# Patient Record
Sex: MALE | Race: WHITE | NOT HISPANIC OR LATINO | Employment: FULL TIME | URBAN - METROPOLITAN AREA
[De-identification: names, ages, dates, MRNs, and addresses within clinical notes are randomized per-mention and may not be internally consistent; named-entity substitution may affect disease eponyms.]

---

## 2020-07-23 ENCOUNTER — HISTORICAL (OUTPATIENT)
Dept: ADMINISTRATIVE | Facility: HOSPITAL | Age: 50
End: 2020-07-23

## 2020-07-23 LAB
ABS NEUT (OLG): 3.8 X10(3)/MCL (ref 2.1–9.2)
ALBUMIN SERPL-MCNC: 4.5 GM/DL (ref 3.4–5)
ALBUMIN/GLOB SERPL: 1.32 {RATIO} (ref 1.5–2.5)
ALP SERPL-CCNC: 76 UNIT/L (ref 38–126)
ALT SERPL-CCNC: 16 UNIT/L (ref 7–52)
APPEARANCE, UA: CLEAR
AST SERPL-CCNC: 18 UNIT/L (ref 15–37)
BACTERIA #/AREA URNS AUTO: NORMAL /HPF
BILIRUB SERPL-MCNC: 0.6 MG/DL (ref 0.2–1)
BILIRUB UR QL STRIP: NEGATIVE MG/DL
BILIRUBIN DIRECT+TOT PNL SERPL-MCNC: 0.1 MG/DL (ref 0–0.5)
BILIRUBIN DIRECT+TOT PNL SERPL-MCNC: 0.5 MG/DL
BUN SERPL-MCNC: 13 MG/DL (ref 7–18)
CALCIUM SERPL-MCNC: 9.7 MG/DL (ref 8.5–10.1)
CHLORIDE SERPL-SCNC: 103 MMOL/L (ref 98–107)
CHOLEST SERPL-MCNC: 192 MG/DL (ref 0–200)
CHOLEST/HDLC SERPL: 3.8 {RATIO}
CO2 SERPL-SCNC: 27 MMOL/L (ref 21–32)
COLOR UR: YELLOW
CREAT SERPL-MCNC: 1.02 MG/DL (ref 0.6–1.3)
ERYTHROCYTE [DISTWIDTH] IN BLOOD BY AUTOMATED COUNT: 12.7 % (ref 11.5–17)
GLOBULIN SER-MCNC: 3.4 GM/DL (ref 1.2–3)
GLUCOSE (UA): NEGATIVE MG/DL
GLUCOSE SERPL-MCNC: 94 MG/DL (ref 74–106)
HCT VFR BLD AUTO: 40.2 % (ref 42–52)
HDLC SERPL-MCNC: 51 MG/DL (ref 35–60)
HGB BLD-MCNC: 13.5 GM/DL (ref 14–18)
HGB UR QL STRIP: NEGATIVE UNIT/L
KETONES UR QL STRIP: NEGATIVE MG/DL
LDLC SERPL CALC-MCNC: 117 MG/DL (ref 0–129)
LEUKOCYTE ESTERASE UR QL STRIP: NEGATIVE UNIT/L
LYMPHOCYTES # BLD AUTO: 1.7 X10(3)/MCL (ref 0.6–3.4)
LYMPHOCYTES NFR BLD AUTO: 27.9 % (ref 13–40)
MCH RBC QN AUTO: 30.3 PG (ref 27–31.2)
MCHC RBC AUTO-ENTMCNC: 34 GM/DL (ref 32–36)
MCV RBC AUTO: 90 FL (ref 80–94)
MONOCYTES # BLD AUTO: 0.6 X10(3)/MCL (ref 0.1–1.3)
MONOCYTES NFR BLD AUTO: 10.6 % (ref 0.1–24)
NEUTROPHILS NFR BLD AUTO: 61.5 % (ref 47–80)
NITRITE UR QL STRIP.AUTO: NEGATIVE
PH UR STRIP: 7 [PH]
PLATELET # BLD AUTO: 261 X10(3)/MCL (ref 130–400)
PMV BLD AUTO: 10.5 FL (ref 9.4–12.4)
POTASSIUM SERPL-SCNC: 4.3 MMOL/L (ref 3.5–5.1)
PROT SERPL-MCNC: 7.9 GM/DL (ref 6.4–8.2)
PROT UR QL STRIP: NEGATIVE MG/DL
RBC # BLD AUTO: 4.45 X10(6)/MCL (ref 4.7–6.1)
RBC #/AREA URNS HPF: NORMAL /HPF
SODIUM SERPL-SCNC: 139 MMOL/L (ref 136–145)
SP GR UR STRIP: 1.01
SQUAMOUS EPITHELIAL, UA: NORMAL /LPF
TRIGL SERPL-MCNC: 108 MG/DL (ref 30–150)
UROBILINOGEN UR STRIP-ACNC: 0.2 MG/DL
VLDLC SERPL CALC-MCNC: 21.6 MG/DL
WBC # SPEC AUTO: 6.1 X10(3)/MCL (ref 4.5–11.5)
WBC #/AREA URNS AUTO: NORMAL /[HPF]

## 2021-04-12 ENCOUNTER — HISTORICAL (OUTPATIENT)
Dept: ADMINISTRATIVE | Facility: HOSPITAL | Age: 51
End: 2021-04-12

## 2021-04-12 LAB
ABS NEUT (OLG): 3.3 X10(3)/MCL (ref 2.1–9.2)
ALBUMIN SERPL-MCNC: 4.8 GM/DL (ref 3.4–5)
ALBUMIN/GLOB SERPL: 1.85 {RATIO} (ref 1.5–2.5)
ALP SERPL-CCNC: 77 UNIT/L (ref 38–126)
ALT SERPL-CCNC: 18 UNIT/L (ref 7–52)
AST SERPL-CCNC: 17 UNIT/L (ref 15–37)
BILIRUB SERPL-MCNC: 0.7 MG/DL (ref 0.2–1)
BILIRUBIN DIRECT+TOT PNL SERPL-MCNC: 0.1 MG/DL (ref 0–0.5)
BILIRUBIN DIRECT+TOT PNL SERPL-MCNC: 0.6 MG/DL
BUN SERPL-MCNC: 15 MG/DL (ref 7–18)
CALCIUM SERPL-MCNC: 9.9 MG/DL (ref 8.5–10.1)
CHLORIDE SERPL-SCNC: 102 MMOL/L (ref 98–107)
CHOLEST SERPL-MCNC: 223 MG/DL (ref 0–200)
CHOLEST/HDLC SERPL: 3.9 {RATIO}
CO2 SERPL-SCNC: 29 MMOL/L (ref 21–32)
CREAT SERPL-MCNC: 1.02 MG/DL (ref 0.6–1.3)
ERYTHROCYTE [DISTWIDTH] IN BLOOD BY AUTOMATED COUNT: 11.9 % (ref 11.5–17)
GLOBULIN SER-MCNC: 2.6 GM/DL (ref 1.2–3)
GLUCOSE SERPL-MCNC: 102 MG/DL (ref 74–106)
HCT VFR BLD AUTO: 40.4 % (ref 42–52)
HDLC SERPL-MCNC: 57 MG/DL (ref 35–60)
HGB BLD-MCNC: 13.8 GM/DL (ref 14–18)
LDLC SERPL CALC-MCNC: 143 MG/DL (ref 0–129)
LYMPHOCYTES # BLD AUTO: 1.7 X10(3)/MCL (ref 0.6–3.4)
LYMPHOCYTES NFR BLD AUTO: 30.3 % (ref 13–40)
MCH RBC QN AUTO: 30.5 PG (ref 27–31.2)
MCHC RBC AUTO-ENTMCNC: 34 GM/DL (ref 32–36)
MCV RBC AUTO: 89 FL (ref 80–94)
MONOCYTES # BLD AUTO: 0.5 X10(3)/MCL (ref 0.1–1.3)
MONOCYTES NFR BLD AUTO: 9.5 % (ref 0.1–24)
NEUTROPHILS NFR BLD AUTO: 60.2 % (ref 47–80)
PLATELET # BLD AUTO: 269 X10(3)/MCL (ref 130–400)
PMV BLD AUTO: 9.2 FL (ref 9.4–12.4)
POTASSIUM SERPL-SCNC: 5.1 MMOL/L (ref 3.5–5.1)
PROT SERPL-MCNC: 7.4 GM/DL (ref 6.4–8.2)
RBC # BLD AUTO: 4.53 X10(6)/MCL (ref 4.7–6.1)
SODIUM SERPL-SCNC: 136 MMOL/L (ref 136–145)
TRIGL SERPL-MCNC: 68 MG/DL (ref 30–150)
VLDLC SERPL CALC-MCNC: 13.6 MG/DL
WBC # SPEC AUTO: 5.5 X10(3)/MCL (ref 4.5–11.5)

## 2021-07-19 ENCOUNTER — HISTORICAL (OUTPATIENT)
Dept: ADMINISTRATIVE | Facility: HOSPITAL | Age: 51
End: 2021-07-19

## 2021-07-19 LAB
ABS NEUT (OLG): 2.6 X10(3)/MCL (ref 2.1–9.2)
ALBUMIN SERPL-MCNC: 4.4 GM/DL (ref 3.4–5)
ALBUMIN/GLOB SERPL: 1.76 {RATIO} (ref 1.5–2.5)
ALP SERPL-CCNC: 74 UNIT/L (ref 38–126)
ALT SERPL-CCNC: 22 UNIT/L (ref 7–52)
APPEARANCE, UA: CLEAR
AST SERPL-CCNC: 18 UNIT/L (ref 15–37)
BACTERIA #/AREA URNS AUTO: ABNORMAL /HPF
BILIRUB SERPL-MCNC: 0.7 MG/DL (ref 0.2–1)
BILIRUB UR QL STRIP: NEGATIVE MG/DL
BILIRUBIN DIRECT+TOT PNL SERPL-MCNC: 0.1 MG/DL (ref 0–0.5)
BILIRUBIN DIRECT+TOT PNL SERPL-MCNC: 0.6 MG/DL
BUN SERPL-MCNC: 16 MG/DL (ref 7–18)
CALCIUM SERPL-MCNC: 9.6 MG/DL (ref 8.5–10.1)
CHLORIDE SERPL-SCNC: 104 MMOL/L (ref 98–107)
CHOLEST SERPL-MCNC: 213 MG/DL (ref 0–200)
CHOLEST/HDLC SERPL: 3.9 {RATIO}
CO2 SERPL-SCNC: 28 MMOL/L (ref 21–32)
COLOR UR: YELLOW
CREAT SERPL-MCNC: 0.93 MG/DL (ref 0.6–1.3)
ERYTHROCYTE [DISTWIDTH] IN BLOOD BY AUTOMATED COUNT: 12.3 % (ref 11.5–17)
GLOBULIN SER-MCNC: 2.5 GM/DL (ref 1.2–3)
GLUCOSE (UA): NEGATIVE MG/DL
GLUCOSE SERPL-MCNC: 102 MG/DL (ref 74–106)
HCT VFR BLD AUTO: 40.1 % (ref 42–52)
HDLC SERPL-MCNC: 55 MG/DL (ref 35–60)
HGB BLD-MCNC: 13.1 GM/DL (ref 14–18)
HGB UR QL STRIP: ABNORMAL UNIT/L
KETONES UR QL STRIP: NEGATIVE MG/DL
LDLC SERPL CALC-MCNC: 142 MG/DL (ref 0–129)
LEUKOCYTE ESTERASE UR QL STRIP: NEGATIVE UNIT/L
LYMPHOCYTES # BLD AUTO: 1.3 X10(3)/MCL (ref 0.6–3.4)
LYMPHOCYTES NFR BLD AUTO: 30.2 % (ref 13–40)
MCH RBC QN AUTO: 29.2 PG (ref 27–31.2)
MCHC RBC AUTO-ENTMCNC: 33 GM/DL (ref 32–36)
MCV RBC AUTO: 89 FL (ref 80–94)
MONOCYTES # BLD AUTO: 0.5 X10(3)/MCL (ref 0.1–1.3)
MONOCYTES NFR BLD AUTO: 12.1 % (ref 0.1–24)
NEUTROPHILS NFR BLD AUTO: 57.7 % (ref 47–80)
NITRITE UR QL STRIP.AUTO: NEGATIVE
PH UR STRIP: 7 [PH]
PLATELET # BLD AUTO: 266 X10(3)/MCL (ref 130–400)
PMV BLD AUTO: 9.2 FL (ref 9.4–12.4)
POTASSIUM SERPL-SCNC: 4.4 MMOL/L (ref 3.5–5.1)
PROT SERPL-MCNC: 6.9 GM/DL (ref 6.4–8.2)
PROT UR QL STRIP: NEGATIVE MG/DL
RBC # BLD AUTO: 4.49 X10(6)/MCL (ref 4.7–6.1)
RBC #/AREA URNS HPF: ABNORMAL /HPF
SODIUM SERPL-SCNC: 140 MMOL/L (ref 136–145)
SP GR UR STRIP: 1.02
SQUAMOUS EPITHELIAL, UA: ABNORMAL /LPF
T4 FREE SERPL-MCNC: 0.98 NG/DL (ref 0.76–1.46)
TRIGL SERPL-MCNC: 77 MG/DL (ref 30–150)
TSH SERPL-ACNC: 0.7 MIU/ML (ref 0.35–4.94)
UROBILINOGEN UR STRIP-ACNC: 0.2 MG/DL
VLDLC SERPL CALC-MCNC: 15.4 MG/DL
WBC # SPEC AUTO: 4.4 X10(3)/MCL (ref 4.5–11.5)
WBC #/AREA URNS AUTO: ABNORMAL /[HPF]

## 2021-07-22 ENCOUNTER — HISTORICAL (OUTPATIENT)
Dept: ADMINISTRATIVE | Facility: HOSPITAL | Age: 51
End: 2021-07-22

## 2021-07-22 LAB
FERRITIN SERPL-MCNC: 220.41 NG/ML (ref 21.81–274.66)
IRON SATN MFR SERPL: 40 % (ref 20–50)
IRON SERPL-MCNC: 123 UG/DL (ref 65–175)
TIBC SERPL-MCNC: 186 UG/DL (ref 69–240)
TIBC SERPL-MCNC: 309 UG/DL (ref 250–450)
TRANSFERRIN SERPL-MCNC: 266 MG/DL (ref 174–364)

## 2022-04-10 ENCOUNTER — HISTORICAL (OUTPATIENT)
Dept: ADMINISTRATIVE | Facility: HOSPITAL | Age: 52
End: 2022-04-10

## 2022-04-25 VITALS
DIASTOLIC BLOOD PRESSURE: 70 MMHG | BODY MASS INDEX: 28.12 KG/M2 | HEIGHT: 70 IN | OXYGEN SATURATION: 98 % | SYSTOLIC BLOOD PRESSURE: 130 MMHG | WEIGHT: 196.44 LBS

## 2022-05-03 NOTE — HISTORICAL [facility] CERNER
This is a historical note converted from Leanna. Formatting and pictures may have been removed.  Please reference Leanna for original formatting and attached multimedia. Chief Complaint  Recheck- NPO  History of Present Illness  Patient presents today for routine follow-up. ?He is taking all medications as prescribed. ?He is checking his blood pressures regularly at home?with readings?less than 130/90.? He is following a healthy diet?and is physically active 6 days a week. ?He has lost almost 50 pounds since last year?due to change in diet?and?being more active.? He is eating less salt. He has no complaints today. ?He feels great.??He is taking Alvesco inhaler?for asthma. ?He denies having to use albuterol?over the last year. ?He denies chest pain or shortness of breath.  Review of Systems  GENERAL: No weight loss, no?weight gain, no fever, no fatigue, no chills, no night sweats  VISION: No vision changes, no blurry vision, no double vision  CARDIAC: No chest pain, no palpitations, no dyspnea on exertion, no orthopnea  RESPIRATORY: No cough, no wheezing, no sputum production, no?SOB  EXTREMITIES: No weakness, no edema  NEURO: No HA, no numbness, no tingling, no weakness, no dizziness  ?  Physical Exam  Vitals & Measurements  HR:?84(Peripheral)? BP:?126/86? SpO2:?98%?  HT:?177.00?cm? WT:?87.600?kg? BMI:?27.96?  General: Well developed, well nourished in no apparent distress, alert and oriented x3  Chest:?CTA?bilaterally, no wheezes crackles or rubs  Cardiac: RRR,?no murmurs, rubs, gallops  Extremities:?No clubbing, cyanosis, or edema.? Joints WNL, +2 DP/PT pulses bilaterally  ?  Assessment/Plan  1.?Hypertension?I10  Stable and well controlled at this time. Continue current treatment. Limit salt in diet. Monitor BP at home. ?  Ordered:  CBC w/ Auto Diff, Routine collect, 04/12/21 9:41:00 CDT, Blood, Order for future visit, Stop date 04/12/21 9:41:00 CDT, Lab Collect, Anemia  Hypertension  Asthma, 04/12/21 9:41:00  CDT  Comprehensive Metabolic Panel, Routine collect, 04/12/21 9:41:00 CDT, Blood, Order for future visit, Stop date 04/12/21 9:41:00 CDT, Lab Collect, Hypertension  Asthma, 04/12/21 9:41:00 CDT  Lab Collection Request, 04/12/21 9:41:00 CDT, [facility] AMB - AFP, 04/12/21 9:41:00 CDT, Hypertension  Asthma  Anemia  Lipid Panel, Routine collect, 04/12/21 9:41:00 CDT, Blood, Order for future visit, Stop date 04/12/21 9:41:00 CDT, Lab Collect, Hypertension, 04/12/21 9:41:00 CDT  Office/Outpatient Visit Level 3 Established [id number] PC, Hypertension  Asthma  Allergies  Anemia, [facility] AMB - AFP, 04/12/21 9:42:00 CDT  ?  2.?Asthma?J45.909  Stable/well controlled with?Alvesco Will continue to monitor.?  Ordered:  CBC w/ Auto Diff, Routine collect, 04/12/21 9:41:00 CDT, Blood, Order for future visit, Stop date 04/12/21 9:41:00 CDT, Lab Collect, Anemia  Hypertension  Asthma, 04/12/21 9:41:00 CDT  Comprehensive Metabolic Panel, Routine collect, 04/12/21 9:41:00 CDT, Blood, Order for future visit, Stop date 04/12/21 9:41:00 CDT, Lab Collect, Hypertension  Asthma, 04/12/21 9:41:00 CDT  Lab Collection Request, 04/12/21 9:41:00 CDT, [facility] AMB - AFP, 04/12/21 9:41:00 CDT, Hypertension  Asthma  Anemia  Office/Outpatient Visit Level 3 Established [id number] PC, Hypertension  Asthma  Allergies  Anemia, [facility] AMB - AFP, 04/12/21 9:42:00 CDT  ?  3.?Allergies?T78.40XA  Stable/well controlled with current treatment. Will continue to monitor.?  Ordered:  Office/Outpatient Visit Level 3 Established [id number] PC, Hypertension  Asthma  Allergies  Anemia, [facility] AMB - AFP, 04/12/21 9:42:00 CDT  ?  4.?Anemia?D64.9  CBC today.  Ordered:  CBC w/ Auto Diff, Routine collect, 04/12/21 9:41:00 CDT, Blood, Order for future visit, Stop date 04/12/21 9:41:00 CDT, Lab Collect, Anemia  Hypertension  Asthma, 04/12/21 9:41:00 CDT  Lab Collection Request, 04/12/21 9:41:00 CDT, [facility] AMB - AFP, 04/12/21 9:41:00 CDT, Hypertension  Asthma   Anemia  Office/Outpatient Visit Level 3 Established [id number] PC, Hypertension  Asthma  Allergies  Anemia, [facility] AMB - [facility], 04/12/21 9:42:00 CDT  ?  Orders:  ciclesonide, 80 mcg = 1 puff(s), INH, BID, PRN PRN cough or wheezing, 90 day supply, # 3 EA, 3 Refill(s), Pharmacy: [facility][id number], 177, cm, Height/Length Dosing, 04/12/21 9:27:00 CDT, 87.6, kg, Weight Dosing, 04/12/21 9:27:00 CDT  losartan, 50 mg = 1 tab(s), Oral, BID, # 180 tab(s), 3 Refill(s), Pharmacy: [facility] #[id number], 177, cm, Height/Length Dosing, 04/12/21 9:27:00 CDT, 87.6, kg, Weight Dosing, 04/12/21 9:27:00 CDT  Clinic Follow up, *Est. 07/25/21 3:00:00 CDT, Order for future visit, Wellness examination, [facility]  Pt request routine blood work due to intentional weight loss--CBC, CMP, Lipids today.  Wellness due 7/2021--will schedule today. Pt aware to come fasting.  Referrals  Clinic Follow up, *Est. 07/25/21 3:00:00 CDT, Order for future visit, Wellness examination, [facility]   Problem List/Past Medical History  Ongoing  Allergies  Asthma  Gastroesophageal reflux disease  Hypertension  Obesity  Obstructive sleep apnea on CPAP  Historical  No qualifying data  Procedure/Surgical History  Cholecystectomy (2000)  Adenoidectomy (1977)   Medications  Alvesco 80 mcg/inh inhalation aerosol, 80 mcg= 1 puff(s), INH, BID, PRN, 3 refills  aspirin 81 mg oral Delayed Release (EC) tablet, 81 mg= 1 tab(s), Oral, Daily  famotidine 10 mg oral tablet, 10 mg= 1 tab(s), Oral, BID  losartan 50 mg oral tablet, 50 mg= 1 tab(s), Oral, BID, 3 refills  Ventolin HFA 90 mcg/inh inhalation aerosol, 1 puff(s), INH, Once, PRN  Zyrtec 10 mg oral tablet, 10 mg= 1 tab(s), Oral, Daily  Allergies  penicillin?(Anaphylaxis)  Social History  Abuse/Neglect  No, 07/23/2020  Alcohol  Past, 04/12/2021  Employment/School  Employed, 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 Living situation: Home/Independent. CPAP/BiPAP, 07/23/2020  Nutrition/Health  Regular, Low carbohydrate, Good, 07/23/2020  Substance Use  Never, 07/23/2020  Tobacco  Never (less than 100 in lifetime), N/A, 07/23/2020  Family History  Basal cell carcinoma: Father.  Breast cancer: Mother.  Brother: History is negative  Immunizations  Vaccine Date Status   COVID-19, vector nr, [id number] - Benitez 03/10/2021 Recorded   influenza virus vaccine, inactivated 10/17/2020 Recorded   Health Maintenance  Health Maintenance  ???Pending?(in the next year)  ??? ??OverDue  ??? ? ? ?Alcohol Misuse Screening due??01/02/21??and every 1??year(s)  ??? ??Due?  ??? ? ? ?ADL Screening due??04/12/21??and every 1??year(s)  ??? ? ? ?Asthma Management-Written Action Plan due??04/12/21??and every 6??month(s)  ??? ? ? ?Asthma Management-Spirometry due??04/12/21??Variable frequency  ??? ? ? ?Asthma Management-Silva Peak Flow due??04/12/21??Variable frequency  ??? ? ? ?Asthma Management-Asthma Education due??04/12/21??and every 6??month(s)  ??? ? ? ?Depression Screening due??04/12/21??Unknown Frequency  ??? ? ? ?Hypertension Management-Education due??04/12/21??and every 1??year(s)  ??? ? ? ?Tetanus Vaccine due??04/12/21??and every 10??year(s)  ??? ??Due In Future?  ??? ? ? ?Hypertension Management-BMP not due until??07/23/21??and every 1??year(s)  ??? ? ? ?Aspirin Therapy for CVD Prevention not due until??07/23/21??and every 1??year(s)  ??? ? ? ?Influenza Vaccine not due until??10/01/21??and every 1??day(s)  ??? ? ? ?Obesity Screening not due until??01/01/22??and every 1??year(s)  ???Satisfied?(in the past 1 year)  ??? ??Satisfied?  ??? ? ? ?Aspirin Therapy for CVD Prevention on??07/23/20.  ??? ? ? ?Asthma Management-Asthma Medication Prescribed on??04/12/21.??Satisfied by Zohaib HARO, Elizabeth Gloria  ??? ? ? ?Blood Pressure Screening on??04/12/21.??Satisfied by Itzel Taylor LPN  ??? ? ? ?Body Mass Index Check on??04/12/21.??Satisfied by  Itzel Taylor LPN  ??? ? ? ?Diabetes Screening on??07/23/20.??Satisfied by Liv Gilbert  ??? ? ? ?Hypertension Management-Blood Pressure on??04/12/21.??Satisfied by Itzel Taylor LPN  ??? ? ? ?Influenza Vaccine on??10/17/20.??Satisfied by Itzel Taylor LPN  ??? ? ? ?Lipid Screening on??07/23/20.??Satisfied by Liv Gilbert  ??? ? ? ?Obesity Screening on??04/12/21.??Satisfied by Itzel Taylor LPN  ?      Patients condition discussed in detail with nurse practitioner.?  I have reviewed and agree with plan of care and follow-up.

## 2022-05-03 NOTE — HISTORICAL [facility]
This is a historical note converted from Leanna. Formatting and pictures may have been removed.  Please reference Leanna for original formatting and attached multimedia. Chief Complaint  New Patient- Establish Care-  Non fasting  History of Present Illness  Pt presents to establish care.  He and his wife moved to [place] 4 days ago.  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 He has 90 employees in the [facility] and 50 in [place].  He is ; no children. This is his second marriage.  He has a bottle of wine every night with his wife.  No tobacco.  He has 3-4 cups of tea a day.  He exercises every day.  He has lost 25 # in the last 4-5 months.  He sleeps pretty well; he uses his C-PAP daily.  His appetite is good.  He had a pneumococcal vaccine 11/2019.  He had a Tdap 7/2019.  Review of Systems  Constitutional:??no?weight gain,??no?weight loss,??no?fatigue, his energy levels are decent due to moving and hectic schedule, ??no?fever, ?no?chills, ?no?weakness, ?no?trouble sleeping  Eyes: ?no?vision loss/changes,??+ contacts,??no?pain,??no?redness,??no?blurry or double vision,??no?flashing lights,??no?glaucoma,??no?cataracts  Last eye exam:?last saw an eye doctor a few years ago  Neck: ?no?lymphadenopathy,??no?thyroid abnormalities,??no?bruits,??no?stiffness  Ears:??+?decreased hearing,??no?tinnitus,??no?earache,??no?drainage?  Nose:??no?congestion,??no?rhinorrhea,??no?epistaxis,??no?sinus pressure, + allergies: perennial  Throat/Oral:??no?sore throat,??no?hoarseness, ?no?dental caries,??no?gum bleeding,??no?oral lesions  Cardiovascular:??+?chest pain, palpitations,?+ tightness,?he has had multiple cardiac testing done with normal results, it has improved significantly with daily exercise, ?no?dyspnea with exertion,??no?orthopnea,??no?paroxysmal nocturnal dyspnea  Respiratory:??no?cough,??no?sputum,??no?hemoptysis,??no?dyspnea,??no?wheezing,?+ asthma,  no?pleuritic chest pain?  Gastrointestinal:??no?abdominal pain,??no?nausea,??no?vomiting,??+?heartburn,?+ GERD, used PPIs for 5-6 years, he is now on H2 blockers, he has?had 2 EGDs with Schatzi rings present??no?dysphagia or odynophagia,??no?diarrhea,??no?constipation,??no?melena,??no?hematochezia,?no?jaundice  Urinary:??no?frequency,??no?urgency,??no?burning or pain,?no?hematuria,??no?incontinence,??no?hesitancy,??+?incomplete voiding: occasional,??no?flank pain,??no?nocturia, no problems with erections  Musculoskeletal:?no?myalgias,??no?arthralgias,?no?neck pain,??no?back pain,??+?swelling of extremities: ankles toward end of day, resolved by am, seems to have started after he started Alvesco  Skin:?no?rashes,??no?sores,??no?non-healing wounds  Neurologic:??no?headaches,??+?dizziness/lightheadedness, if he stands up too fast,??no?tremors,??no?paresthesias,??no?seizures,??no?muscle weakness  Psychiatric:??no?depression/sadness,??no?anhedonia,??no?irritability,??no?suicidal ideations,??no?anxiety,??no?panic attacks  Endocrine:??no?hot or cold intolerance,??no?sweating,??no?polyuria,??no?polydipsia,??no?polyphagia  Hematologic:??no?bruising,??no?bleeding disorders?  Physical Exam  Vitals & Measurements  T:?36.5? ?C (Oral)? HR:?72(Peripheral)? BP:?140/80?  HT:?177?cm? WT:?96.3?kg? BMI:?30.74?  ?  GENERAL: The patient is a well-developed, well-nourished?white male in no?apparent distress. He is alert and oriented x 4.  HEENT: Head is normocephalic and atraumatic. Extraocular muscles are intact. Pupils are equal, round, and reactive to light and accommodation. Nares: edematous mucosa/turbinates. Mouth is well hydrated and without lesions. Mucous membranes are moist. Posterior pharynx clear of any exudate or lesions.  NECK: Supple. No carotid bruits.? No lymphadenopathy or thyromegaly.  LUNGS: Clear to auscultation.  HEART: Regular rate and rhythm without murmur, gallops or rubs.  ABDOMEN: Soft, nontender, and  nondistended.? Positive bowel sounds.? No hepatosplenomegaly was noted.  EXTREMITIES: Without any cyanosis, clubbing, rash, lesions or edema.  NEUROLOGIC: Cranial nerves II through XII are grossly intact.? No motor or sensory deficits.? Cerebellar function intact.  SKIN: No ulceration or induration present.  :? Normal male genitalia, no hernias,?testes descended with normal size and consistency.  ?  Assessment/Plan  1.?Wellness examination?Z00.00  Patient presents for wellness examination.  He recently moved?here from [place].  He has been feeling well.  He reports some occasional swelling of his?ankles.? I advised him to decrease his sodium intake,?elevate his legs at night?and consider compression stockings.  He has had?extensive cardiac testing with normal results.  He will need a screening colonoscopy at age 51.  He is up-to-date with his vaccinations.  Labs pending.  He needs a referral to an eye doctor;?he has keratoacanthomas?and wears hard contacts.  Ordered:  Automated Diff, Routine collect, 07/23/20 12:01:00 CDT, Blood, Collected, Stop date 07/23/20 12:01:00 CDT, Lab Collect, Wellness examination  Hypertension, 07/23/20 12:01:00 CDT  CBC w/ Auto Diff, Routine collect, 07/23/20 12:01:00 CDT, Blood, Stop date 07/23/20 12:01:00 CDT, Lab Collect, Wellness examination  Hypertension, 07/23/20 12:01:00 CDT  Clinic Follow-Up Wellness, *Est. 07/23/21 3:00:00 CDT, Order for future visit, Wellness examination, [facility]  Comprehensive Metabolic Panel, Routine collect, 07/23/20 12:01:00 CDT, Blood, Stop date 07/23/20 12:01:00 CDT, Lab Collect, Wellness examination  Hypertension, 07/23/20 12:01:00 CDT  Lipid Panel, Routine collect, 07/23/20 12:01:00 CDT, Blood, Stop date 07/23/20 12:01:00 CDT, Lab Collect, Wellness examination  Hypertension, 07/23/20 12:01:00 CDT  [facility] New 40-64 years [id number] PC, Wellness examination  Hypertension  Gastroesophageal reflux disease  Obstructive sleep apnea  on CPAP  Asthma  Allergies, [facility] AMB - AFP, 07/23/20 11:57:00 CDT  Urinalysis no Reflex, Routine collect, Urine, 07/23/20 12:00:00 CDT, Stop date 07/23/20 12:01:00 CDT, Nurse collect, Wellness examination  Hypertension  ?  2.?Hypertension?I10  ?Well-controlled?per home readings.  Continue current medication; refill sent.  Ordered:  Automated Diff, Routine collect, 07/23/20 12:01:00 CDT, Blood, Collected, Stop date 07/23/20 12:01:00 CDT, Lab Collect, Wellness examination  Hypertension, 07/23/20 12:01:00 CDT  CBC w/ Auto Diff, Routine collect, 07/23/20 12:01:00 CDT, Blood, Stop date 07/23/20 12:01:00 CDT, Lab Collect, Wellness examination  Hypertension, 07/23/20 12:01:00 CDT  Clinic Follow up, *Est. 01/27/21 9:30:00 CST, Order for future visit, Allergies, [facility] AFP  Comprehensive Metabolic Panel, Routine collect, 07/23/20 12:01:00 CDT, Blood, Stop date 07/23/20 12:01:00 CDT, Lab Collect, Wellness examination  Hypertension, 07/23/20 12:01:00 CDT  Lipid Panel, Routine collect, 07/23/20 12:01:00 CDT, Blood, Stop date 07/23/20 12:01:00 CDT, Lab Collect, Wellness examination  Hypertension, 07/23/20 12:01:00 CDT  [facility] Care New 40-64 years [id number] PC, Wellness examination  Hypertension  Gastroesophageal reflux disease  Obstructive sleep apnea on CPAP  Asthma  Allergies, [facility] AMB - AFP, 07/23/20 11:57:00 CDT  Urinalysis no Reflex, Routine collect, Urine, 07/23/20 12:00:00 CDT, Stop date 07/23/20 12:01:00 CDT, Nurse collect, Wellness examination  Hypertension  ?  3.?Gastroesophageal reflux disease?K21.9  Patient?takes Pepcid over-the-counter with good relief.? He has a history of?Schatzki rings x2; he denies any current swallowing difficulties.? He will let me know if he develops any issues.  Ordered:  Clinic Follow up, *Est. 01/27/21 9:30:00 CST, Order for future visit, Allergies, [facility] AFP  Preventative Health Care New 40-64 years [id number] PC, Wellness examination  Hypertension  Gastroesophageal  reflux disease  Obstructive sleep apnea on CPAP  Asthma  Allergies, [facility] AMB - AFP, 07/23/20 11:57:00 CDT  ?  4.?Obstructive sleep apnea on CPAP?G47.33  ?Patient has been compliant with and doing well on CPAP.  Ordered:  Clinic Follow up, *Est. 01/27/21 9:30:00 CST, Order for future visit, Allergies, [facility]  Preventative Health Care New 40-64 years [id number] PC, Wellness examination  Hypertension  Gastroesophageal reflux disease  Obstructive sleep apnea on CPAP  Asthma  Allergies, [facility] AMB - AFP, 07/23/20 11:57:00 CDT  ?  5.?Asthma?J45.909  ?Stable; he has done very well since being on Alvesco, refills sent to pharmacy.? He may have used rescue inhaler 10 times in the last year.? He occasionally uses it for marked exertion.  Ordered:  Clinic Follow up, *Est. 01/27/21 9:30:00 CST, Order for future visit, Allergies, [facility]  Preventative Health Care New 40-64 years [id number] PC, Wellness examination  Hypertension  Gastroesophageal reflux disease  Obstructive sleep apnea on CPAP  Asthma  Allergies, [facility] AMB - AFP, 07/23/20 11:57:00 CDT  ?  6.?Allergies?T78.40XA  ?Stable.  Ordered:  Clinic Follow up, *Est. 01/27/21 9:30:00 CST, Order for future visit, Allergies, [facility] AFP  Preventative Health Care New 40-64 years [id number] PC, Wellness examination  Hypertension  Gastroesophageal reflux disease  Obstructive sleep apnea on CPAP  Asthma  Allergies, [facility] AMB - AFP, 07/23/20 11:57:00 CDT  ?  Orders:  ciclesonide, 80 mcg = 1 puff(s), INH, BID, PRN PRN cough or wheezing, 90 day supply, # 3 EA, 3 Refill(s), Pharmacy: [facility] #[id number], 177, cm, Height/Length Dosing, 07/23/20 11:04:00 CDT, 96.3, kg, Weight Dosing, 07/23/20 11:06:00 CDT  losartan, 50 mg = 1 tab(s), Oral, BID, # 180 tab(s), 3 Refill(s), Pharmacy: [facility] #[id number], 177, cm, Height/Length Dosing, 07/23/20 11:04:00 CDT, 96.3, kg, Weight Dosing, 07/23/20 11:06:00 CDT  Referrals  Clinic Follow up, *Est. 01/27/21 9:30:00 CST, Order  for future visit, Allergies, [facility]  Clinic Follow-Up Wellness, *Est. 07/23/21 3:00:00 CDT, Order for future visit, Wellness examination, [facility]   Problem List/Past Medical History  Ongoing  Allergies  Asthma  Gastroesophageal reflux disease  Hypertension  Obesity  Obstructive sleep apnea on CPAP  Historical  No qualifying data  Procedure/Surgical History  Cholecystectomy (2000)  Adenoidectomy (1977)   Medications  Alvesco 80 mcg/inh inhalation aerosol, 80 mcg= 1 puff(s), INH, BID, PRN, 3 refills  aspirin 81 mg oral Delayed Release (EC) tablet, 81 mg= 1 tab(s), Oral, Daily  famotidine 10 mg oral tablet, 10 mg= 1 tab(s), Oral, BID  losartan 50 mg oral tablet, 50 mg= 1 tab(s), Oral, BID, 3 refills  Ventolin HFA 90 mcg/inh inhalation aerosol, 1 puff(s), INH, Once, PRN  Zyrtec 10 mg oral tablet, 10 mg= 1 tab(s), Oral, Daily  Allergies  penicillin?(Anaphylaxis)  Social History  Abuse/Neglect  No, 07/23/2020  Alcohol  Current, Daily, 07/23/2020  Employment/School  Employed, Work/School description: [employer] business., 07/23/2020  Exercise  Exercise frequency: Daily., 07/23/2020  Home/Environment  Lives with Spouse. Living situation: Home/Independent. CPAP/BiPAP, 07/23/2020  Nutrition/Health  Regular, Low carbohydrate, Good, 07/23/2020  Substance Use  Never, 07/23/2020  Tobacco  Never (less than 100 in lifetime), N/A, 07/23/2020  Family History  Basal cell carcinoma: Father.  Breast cancer: Mother.  Brother: History is negative  Health Maintenance  Health Maintenance  ???Pending?(in the next year)  ??? ??OverDue  ??? ? ? ?Alcohol Misuse Screening due??01/02/20??and every 1??year(s)  ??? ??Due?  ??? ? ? ?ADL Screening due??07/23/20??and every 1??year(s)  ??? ? ? ?Asthma Management-Written Action Plan due??07/23/20??and every 6??month(s)  ??? ? ? ?Asthma Management-Spirometry due??07/23/20??Variable frequency  ??? ? ? ?Asthma Management-Silva Peak Flow due??07/23/20??Variable frequency  ??? ? ? ?Asthma  Management-Asthma Education due??07/23/20??and every 6??month(s)  ??? ? ? ?Hypertension Management-Education due??07/23/20??and every 1??year(s)  ??? ? ? ?Tetanus Vaccine due??07/23/20??and every 10??year(s)  ??? ??Due In Future?  ??? ? ? ?Obesity Screening not due until??01/01/21??and every 1??year(s)  ???Satisfied?(in the past 1 year)  ??? ??Satisfied?  ??? ? ? ?Aspirin Therapy for CVD Prevention on??07/23/20.  ??? ? ? ?Asthma Management-Asthma Medication Prescribed on??07/23/20.??Satisfied by Davide Adame MD  ??? ? ? ?Blood Pressure Screening on??07/23/20.??Satisfied by Itzel Taylor LPN  ??? ? ? ?Body Mass Index Check on??07/23/20.??Satisfied by Itzel Taylor LPN  ??? ? ? ?Hypertension Management-Blood Pressure on??07/23/20.??Satisfied by Itzel Taylor LPN  ??? ? ? ?Obesity Screening on??07/23/20.??Satisfied by Itzel Taylor LPN  ?

## 2022-05-03 NOTE — HISTORICAL [facility]ERNER
This is a historical note converted from Leanna. Formatting and pictures may have been removed.  Please reference Leanna for original formatting and attached multimedia. Chief Complaint  Annual wellness physical- NPO  History of Present Illness  Pt presents for Wellness exam.  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  He has been feeling well.  He did feel tired for?a while but he is feeling better.  He is ; no children. This is his second marriage.  He drinks quite infrequently now.  No tobacco.  He has a few cups of coffee and 3-4 teas a day.  He exercises every day.  He has lost 45 # in the last 1.5 years.  He sleeps pretty well; he uses his C-PAP daily.  His appetite is good.  He had a pneumococcal vaccine 11/2019.  He had a Tdap 7/2019.  Review of Systems  Constitutional:??no?weight gain,??+?weight loss: 45 # in last?1.5 years, ?no?fatigue, ??no?fever, ?no?chills, ?no?weakness, ?no?trouble sleeping  Eyes: ?no?vision loss/changes,??+ contacts,??no?pain,??no?redness,??no?blurry or double vision,??no?flashing lights,??no?glaucoma,??no?cataracts  Last eye exam:? last saw an eye doctor a few years ago  Neck: ?no?lymphadenopathy,??no?thyroid abnormalities: strong family history of thyroid disease,??no?bruits,??no?stiffness  Ears:??+?decreased hearing,??no?tinnitus,??no?earache,??no?drainage?  Nose:??+?congestion,??+?rhinorrhea,??no?epistaxis,??no?sinus pressure, + allergies: perennial, takes Zyrtec and Nasocort daily  Throat/Oral:??no?sore throat,??no?hoarseness, ?no?dental caries,??no?gum bleeding,??no?oral lesions  Cardiovascular:??+?chest pain, palpitations,?+ tightness,?he has had multiple cardiac testing done with normal results, it has improved significantly with daily exercise, ?no?dyspnea with exertion,??no?orthopnea,??no?paroxysmal nocturnal dyspnea, + hypertension  Respiratory:??no?cough,??no?sputum,??no?hemoptysis,??no?dyspnea,??no?wheezing,?+ asthma,  no?pleuritic chest pain?  Gastrointestinal:??no?abdominal pain,??no?nausea,??no?vomiting,??+?heartburn,?+ GERD, used?proton pump inhibitors?for 5-6 years, he is now on H2 blockers, he has?had 2 EGDs with Schatzi rings present, ?no?dysphagia or odynophagia,??no?diarrhea,??no?constipation,??no?melena,??no?hematochezia,?no?jaundice  Urinary:??no?frequency,??no?urgency,??no?burning or pain,?no?hematuria,??no?incontinence,??no?hesitancy,??+?incomplete voiding: occasional,??no?flank pain,??no?nocturia, no problems with erections  Musculoskeletal:?no?myalgias,??no?arthralgias,?no?neck pain,??no?back pain,??+?swelling of extremities: ankles toward end of day, resolved by am, he did decrease his sodium consumption  Skin:?no?rashes,??no?sores,??no?non-healing wounds  Neurologic:??no?headaches,??no?dizziness/lightheadedness,??no?tremors,??no?paresthesias,??no?seizures,??no?muscle weakness  Psychiatric:??no?depression/sadness,??no?anhedonia,??no?irritability,??no?suicidal ideations,??no?anxiety,??no?panic attacks  Endocrine:??no?hot or cold intolerance,??no?sweating,??no?polyuria,??no?polydipsia,??no?polyphagia  Hematologic:??no?bruising,??no?bleeding disorders?  Physical Exam  Vitals & Measurements  HR:?68(Peripheral)? BP:?130/70? SpO2:?98%?  HT:?177.00?cm? WT:?89.100?kg? BMI:?28.44?  ?  GENERAL: The patient is a well-developed, well-nourished male in no?apparent distress. He is alert and oriented x 4.  HEENT: Head is normocephalic and atraumatic. Extraocular muscles are intact. Pupils are equal, round, and reactive to light and accommodation. Nares appeared normal. Mouth is well hydrated and without lesions. Mucous membranes are moist. Posterior pharynx clear of any exudate or lesions.  NECK: Supple. No carotid bruits.? No lymphadenopathy or thyromegaly.  LUNGS: Clear to auscultation.  HEART: Regular rate and rhythm without murmur, gallops or rubs.  ABDOMEN: Soft, nontender, and nondistended.? Positive bowel sounds.? No  hepatosplenomegaly was noted.  EXTREMITIES: Without any cyanosis, clubbing, rash, lesions or edema.  NEUROLOGIC: Cranial nerves II through XII are grossly intact.? No motor or sensory deficits.? Cerebellar function intact.  SKIN: No ulceration or induration present.  :? Normal male genitalia, no hernias,?testes descended with normal size and consistency.  ?  Assessment/Plan  1.?Wellness examination?Z00.00  ?Patient presents for wellness examination.  He has been feeling well.  He turns 51 in a few weeks.  Colonoscopy versus Cologuard?discussed with patient. ?Benefits/potential risk?discussed with patient.? He is to let me know which one he would like to do.  Patient congratulated on?weight loss?and healthy lifestyle habits.  Labs pending.  Ordered:  Clinic Follow-Up Wellness, *Est. 07/19/22 3:00:00 CDT, Order for future visit, Wellness examination, ink AFP  Comprehensive Metabolic Panel, Routine collect, 07/19/21 8:58:00 CDT, Blood, Stop date 07/19/21 8:59:00 CDT, Lab Collect, Wellness examination  Hypertension, 07/19/21 8:58:00 CDT  Lipid Panel, Routine collect, 07/19/21 8:58:00 CDT, Blood, Stop date 07/19/21 8:59:00 CDT, Lab Collect, Wellness examination  Hypertension, 07/19/21 8:58:00 CDT  Preventative Health Care Est 40-64 years 35605 PC, Wellness examination  Hypertension  Gastroesophageal reflux disease  Obstructive sleep apnea on CPAP  Allergies  Asthma  Family history of thyroid disease, [facility] - [facility], 07/19/21 8:53:00 CDT  Urinalysis no Reflex, Routine collect, Urine, 07/19/21 8:58:00 CDT, Stop date 07/19/21 8:59:00 CDT, Nurse collect, Wellness examination  ?  2.?Hypertension?I10  Well-controlled; continue current medication.  Ordered:  Comprehensive Metabolic Panel, Routine collect, 07/19/21 8:58:00 CDT, Blood, Stop date 07/19/21 8:59:00 CDT, Lab Collect, Wellness examination  Hypertension, 07/19/21 8:58:00 CDT  Lipid Panel, Routine collect, 07/19/21 8:58:00 CDT, Blood, Stop date 07/19/21  8:59:00 CDT, Lab Collect, Wellness examination  Hypertension, 07/19/21 8:58:00 CDT  Preventative Health Care Est 40-64 years 17323 PC, Wellness examination  Hypertension  Gastroesophageal reflux disease  Obstructive sleep apnea on CPAP  Allergies  Asthma  Family history of thyroid disease, [facility] - [facility], 07/19/21 8:53:00 CDT  ?  3.?Gastroesophageal reflux disease?K21.9  Well-controlled; patient is now taking only 1 famotidine a day.  Ordered:  Preventative Health Care Est 40-64 years [address]70 PC, Wellness examination  Hypertension  Gastroesophageal reflux disease  Obstructive sleep apnea on CPAP  Allergies  Asthma  Family history of thyroid disease, [facility] - [facility], 07/19/21 8:53:00 CDT  ?  4.?Obstructive sleep apnea on CPAP?G47.33  Patient is compliant with and doing well with CPAP.? Well-controlled; patient is now taking only 1 famotidine a day.  Ordered:  Preventative Health Care Est 40-64 years 17806 PC, Wellness examination  Hypertension  Gastroesophageal reflux disease  Obstructive sleep apnea on CPAP  Allergies  Asthma  Family history of thyroid disease, [facility] - [facility], 07/19/21 8:53:00 CDT  ?  5.?Allergies?T78.40XA  Patient is doing well?on current medications.  Ordered:  Preventative Health Care Est 40-64 years 19278 PC, Wellness examination  Hypertension  Gastroesophageal reflux disease  Obstructive sleep apnea on CPAP  Allergies  Asthma  Family history of thyroid disease, [facility] - [facility], 07/19/21 8:53:00 CDT  ?  6.?Asthma?J45.909  ?His asthma is well controlled on 1 inhalation of Alvesco daily.  Ordered:  Preventative Health Care Est 40-64 years 66546 PC, Wellness examination  Hypertension  Gastroesophageal reflux disease  Obstructive sleep apnea on CPAP  Allergies  Asthma  Family history of thyroid disease, [facility] - AFP, 07/19/21 8:53:00 CDT  ?  7.?Family history of thyroid disease?Z83.49  ?Patient has multiple family members with thyroid disease; will check TSH and  free T4 this morning.  Ordered:  Free T4, Routine collect, 07/19/21 8:58:00 CDT, Blood, Stop date 07/19/21 8:59:00 CDT, Lab Collect, Family history of thyroid disease, 07/19/21 8:58:00 CDT  Preventative Health Care Est 40-64 years 65947 PC, Wellness examination  Hypertension  Gastroesophageal reflux disease  Obstructive sleep apnea on CPAP  Allergies  Asthma  Family history of thyroid disease, [facility] - AFP, 07/19/21 8:53:00 CDT  Thyroid Stimulating Hormone, Routine collect, 07/19/21 8:58:00 CDT, Blood, Stop date 07/19/21 8:59:00 CDT, Lab Collect, Family history of thyroid disease, 07/19/21 8:58:00 CDT  ?  Orders:  ciclesonide, 80 mcg = 1 puff(s), INH, BID, PRN PRN cough or wheezing, 90 day supply, # 3 EA, 3 Refill(s), Pharmacy: [facility] #[id number], 177, cm, Height/Length Dosing, 07/19/21 8:17:00 CDT, 89.1, kg, Weight Dosing, 07/19/21 8:17:00 CDT  losartan, 50 mg = 1 tab(s), Oral, BID, # 180 tab(s), 3 Refill(s), [facility]acy: [facility] #[id number], 177, cm, Height/Length Dosing, 07/19/21 8:17:00 CDT, 89.1, kg, Weight Dosing, 07/19/21 8:17:00 CDT  Referrals  Clinic Follow up, Order for future visit  Clinic Follow-Up Wellness, *Est. 07/19/22 3:00:00 CDT, Order for future visit, Wellness examination, [facility]   Problem List/Past Medical History  Ongoing  Allergies  Asthma  Gastroesophageal reflux disease  Hypertension  Obesity  Obstructive sleep apnea on CPAP  Historical  No qualifying data  Procedure/Surgical History  Cholecystectomy (2000)  Adenoidectomy (1977)   Medications  Alvesco 80 mcg/inh inhalation aerosol, 80 mcg= 1 puff(s), INH, BID, PRN, 3 refills  aspirin 81 mg oral Delayed Release (EC) tablet, 81 mg= 1 tab(s), Oral, Daily  famotidine 10 mg oral tablet, 10 mg= 1 tab(s), Oral, BID  losartan 50 mg oral tablet, 50 mg= 1 tab(s), Oral, BID, 3 refills  Ventolin HFA 90 mcg/inh inhalation aerosol, 1 puff(s), INH, Once, PRN  Zyrtec 10 mg oral tablet, 10 mg= 1 tab(s), Oral,  Daily  Allergies  penicillin?(Anaphylaxis)  Social History  Abuse/Neglect  No, 07/23/2020  Alcohol  Past, 04/12/2021  Employment/School  Employed, 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 Living situation: Home/Independent. CPAP/BiPAP, 07/23/2020  Nutrition/Health  Regular, Low carbohydrate, Good, 07/23/2020  Substance Use  Never, 07/23/2020  Tobacco  Never (less than 100 in lifetime), N/A, 07/23/2020  Family History  Basal cell carcinoma: Father.  Breast cancer: Mother.  Brother: History is negative  Immunizations  Vaccine Date Status   COVID-19, vector nr, [id number] - [place] 03/10/2021 Recorded   influenza virus vaccine, inactivated 10/17/2020 Recorded   Health Maintenance  Health Maintenance  ???Pending?(in the next year)  ??? ??OverDue  ??? ? ? ?Alcohol Misuse Screening due??01/02/21??and every 1??year(s)  ??? ??Due?  ??? ? ? ?ADL Screening due??07/19/21??and every 1??year(s)  ??? ? ? ?Asthma Management-Written Action Plan due??07/19/21??and every 6??month(s)  ??? ? ? ?Asthma Management-Spirometry due??07/19/21??Variable frequency  ??? ? ? ?Asthma Management-[place] Flow due??07/19/21??Variable frequency  ??? ? ? ?Asthma Management-Asthma Education due??07/19/21??and every 6??month(s)  ??? ? ? ?Depression Screening due??07/19/21??Unknown Frequency  ??? ? ? ?Hypertension Management-Education due??07/19/21??and every 1??year(s)  ??? ? ? ?Tetanus Vaccine due??07/19/21??and every 10??year(s)  ??? ??Due In Future?  ??? ? ? ?Aspirin Therapy for CVD Prevention not due until??07/23/21??and every 1??year(s)  ??? ? ? ?Influenza Vaccine not due until??10/01/21??and every 1??day(s)  ??? ? ? ?Obesity Screening not due until??01/01/22??and every 1??year(s)  ??? ? ? ?Hypertension Management-BMP not due until??04/12/22??and every 1??year(s)  ???Satisfied?(in the past 1 year)  ??? ??Satisfied?  ??? ? ? ?Aspirin Therapy for  CVD Prevention on??07/23/20.  ??? ? ? ?Asthma Management-Asthma Medication Prescribed on??07/19/21.??Satisfied by Davide Adame MD  ??? ? ? ?Blood Pressure Screening on??07/19/21.??Satisfied by Itzel Taylor LPN  ??? ? ? ?Body Mass Index Check on??07/19/21.??Satisfied by Itzel Taylor LPN  ??? ? ? ?Diabetes Screening on??04/12/21.??Satisfied by Liv Gilbert  ??? ? ? ?Hypertension Management-Blood Pressure on??07/19/21.??Satisfied by Itzel Taylor LPN  ??? ? ? ?Influenza Vaccine on??10/17/20.??Satisfied by Itzel Taylor LPN  ??? ? ? ?Lipid Screening on??04/12/21.??Satisfied by Liv Gilbert  ??? ? ? ?Obesity Screening on??07/19/21.??Satisfied by Itzel Taylor LPN  ?

## 2022-06-14 ENCOUNTER — PATIENT MESSAGE (OUTPATIENT)
Dept: ADMINISTRATIVE | Facility: HOSPITAL | Age: 52
End: 2022-06-14

## 2022-07-25 PROBLEM — T78.40XA ALLERGIES: Status: RESOLVED | Noted: 2022-07-25 | Resolved: 2022-07-25

## 2022-07-25 PROBLEM — Z00.00 ENCOUNTER FOR WELLNESS EXAMINATION IN ADULT: Status: ACTIVE | Noted: 2022-07-25

## 2022-07-25 PROBLEM — Z91.09 ENVIRONMENTAL ALLERGIES: Status: ACTIVE | Noted: 2022-07-25

## 2022-07-25 PROBLEM — T78.40XA ALLERGIES: Status: ACTIVE | Noted: 2022-07-25

## 2022-07-25 PROBLEM — Z23 IMMUNIZATION DUE: Status: ACTIVE | Noted: 2022-07-25

## 2022-07-25 PROBLEM — Z12.5 SCREENING PSA (PROSTATE SPECIFIC ANTIGEN): Status: ACTIVE | Noted: 2022-07-25

## 2022-07-25 PROBLEM — Z13.6 ENCOUNTER FOR SCREENING FOR CARDIOVASCULAR DISORDERS: Status: ACTIVE | Noted: 2022-07-25

## 2022-07-25 PROBLEM — Z12.11 ENCOUNTER FOR SCREENING COLONOSCOPY: Status: ACTIVE | Noted: 2022-07-25

## 2022-07-25 PROBLEM — Z82.49 FAMILY HISTORY OF HEART DISEASE: Status: ACTIVE | Noted: 2022-07-25

## 2022-10-24 PROBLEM — Z00.00 ENCOUNTER FOR WELLNESS EXAMINATION IN ADULT: Status: RESOLVED | Noted: 2022-07-25 | Resolved: 2022-10-24

## 2022-10-24 PROBLEM — Z13.6 ENCOUNTER FOR SCREENING FOR CARDIOVASCULAR DISORDERS: Status: RESOLVED | Noted: 2022-07-25 | Resolved: 2022-10-24

## 2023-08-03 PROBLEM — J45.20 MILD INTERMITTENT ASTHMA WITHOUT COMPLICATION: Status: ACTIVE | Noted: 2023-08-03

## 2023-08-03 PROBLEM — I10 PRIMARY HYPERTENSION: Status: ACTIVE | Noted: 2023-08-03

## 2023-08-07 PROBLEM — E66.811 CLASS 1 OBESITY DUE TO EXCESS CALORIES WITH SERIOUS COMORBIDITY AND BODY MASS INDEX (BMI) OF 34.0 TO 34.9 IN ADULT: Status: ACTIVE | Noted: 2023-08-07

## 2023-08-07 PROBLEM — E66.09 CLASS 1 OBESITY DUE TO EXCESS CALORIES WITH SERIOUS COMORBIDITY AND BODY MASS INDEX (BMI) OF 34.0 TO 34.9 IN ADULT: Status: ACTIVE | Noted: 2023-08-07

## 2023-08-07 PROBLEM — R60.9 EDEMA: Status: ACTIVE | Noted: 2023-08-07

## 2023-11-06 PROBLEM — Z00.00 ENCOUNTER FOR WELLNESS EXAMINATION IN ADULT: Status: RESOLVED | Noted: 2022-07-25 | Resolved: 2023-11-06

## 2024-10-29 ENCOUNTER — TELEPHONE (OUTPATIENT)
Dept: PRIMARY CARE CLINIC | Facility: CLINIC | Age: 54
End: 2024-10-29
Payer: COMMERCIAL

## 2024-10-29 DIAGNOSIS — Z00.00 WELLNESS EXAMINATION: Primary | ICD-10-CM

## 2024-11-01 NOTE — PROGRESS NOTES
..Annual Exam (Upper/lower extremity edema)       HPI:     Patient presents for wellness examination.    He has been feeling well.    His appetite is too good; he has gained 13 # since August 2023. He has gained 40 # since 2021.   He sleeps well; he is compliant with and doing well on CPAP.  He is ; no children.  This is his 2nd marriage.    He makes education software.  He has been exercising less than previous. He still exercises daily.   He has wine 5 days a week.   No tobacco.    He has a few cups of coffee a day.      He had a pneumococcal vaccine 11/2019.    He had a Tdap vaccine 07/2019.  EGD 10/2022: Dr. Benson; Schatzki's ring, gastritis.    Colonoscopy 10/2022: Dr. Benson; diverticulosis, follow-up in 10 years.  CT calcium score 8/2022: 0.      The patient's Health Maintenance was reviewed and the following appears to be due at this time:   Health Maintenance Due   Topic Date Due    Hepatitis C Screening  Never done    HIV Screening  Never done    TETANUS VACCINE  Never done    Shingles Vaccine (1 of 2) Never done    COVID-19 Vaccine (5 - 2024-25 season) 09/01/2024       ..  Past Medical History:   Diagnosis Date    Gastroesophageal reflux disease     Obstructive sleep apnea on CPAP           ..  Past Surgical History:   Procedure Laterality Date    ADENOIDECTOMY  1977    CHOLECYSTECTOMY  2000          Current Outpatient Medications   Medication Instructions    albuterol (PROVENTIL HFA) 90 mcg/actuation inhaler 2 puffs, Inhalation, Every 6 hours PRN, Rescue    cetirizine (ZYRTEC) 10 mg, Daily    famotidine (PEPCID) 20 MG tablet Nightly PRN    losartan (COZAAR) 50 mg, Oral, 2 times daily    simvastatin (ZOCOR) 40 mg, Oral, Nightly         ..  Social History     Socioeconomic History    Marital status:     Number of children: 0   Occupational History    Occupation: [employer]   Tobacco Use    Smoking status: Never    Smokeless tobacco: Never   Substance and Sexual Activity     Alcohol use: Yes     Alcohol/week: 12.0 standard drinks of alcohol     Types: 12 Glasses of wine per week     Comment: Usually split a bottle of wine with dinner most nights    Drug use: Never    Sexual activity: Yes     Partners: Female     Birth control/protection: Condom     Social Drivers of Health     Financial Resource Strain: Low Risk  (10/31/2024)    Overall Financial Resource Strain (CARDIA)     Difficulty of Paying Living Expenses: Not hard at all   Food Insecurity: No Food Insecurity (10/31/2024)    Hunger Vital Sign     Worried About Running Out of Food in the Last Year: Never true     Ran Out of Food in the Last Year: Never true   Transportation Needs: No Transportation Needs (11/6/2024)    TRANSPORTATION NEEDS     Transportation : No   Physical Activity: Sufficiently Active (10/31/2024)    Exercise Vital Sign     Days of Exercise per Week: 7 days     Minutes of Exercise per Session: 30 min   Stress: No Stress Concern Present (10/31/2024)    [place] [employer] - Occupational Stress Questionnaire     Feeling of Stress : Only a little   Housing Stability: Unknown (10/31/2024)    Housing Stability Vital Sign     Unable to Pay for Housing in the Last Year: No          ..  Family History   Problem Relation Name Age of Onset    Breast cancer Mother Gracie     Cancer Mother Gracie     Hearing loss Mother Gracie     Basal cell carcinoma Father Justus     Heart disease Father Justus     Hypertension Father Justus     No Known Problems Brother      Kidney disease Maternal Grandmother Brooklyn     Heart disease Maternal Grandfather Dharmesh     Arthritis Paternal Grandmother Shruthi     Hearing loss Paternal Grandmother Shruthi           ..  Review of patient's allergies indicates:   Allergen Reactions    Pcn [penicillins] Anaphylaxis          ..  Immunization History   Administered Date(s) Administered    COVID-19 Vaccine 10/22/2021    COVID-19, MRNA, LN-S, PF (Pfizer) ([place]) 04/02/2022    COVID-19,  "vector-nr, rS-Ad26, PF (Boxever) 03/10/2021    Influenza 10/20/2022    Influenza - Quadrivalent - MDCK - PF 10/17/2020    Influenza - Quadrivalent - PF *Preferred* (6 months and older) 09/08/2021    Influenza - Trivalent - Fluarix, Flulaval, Fluzone, Afluria - PF 10/17/2020          REVIEW OF SYSTEMS:    GENERAL: No weight loss, no weight gain, no fever, + fatigue, no chills, no night sweats  HEENT: No sore throat, no ear pain, no sinus pressure, no nasal congestion, no rhinorrhea, + allergies, worse seasonal, Zyrtec and Nasocort,  no decreased hearing, no snoring  VISION: No vision changes, no blurry vision, no double vision, no glaucoma, no cataracts, + glasses and contacts  LAST EYE EXAM: Monday; [facility] Del Castillo, [facility]  NECK: no LAD  CARDIAC: + chest pain: has had a cardiac checkup, improved with activity,  no palpitations, + dyspnea on exertion, no orthopnea  RESPIRATORY: No cough, no wheezing, no sputum production, no SOB  GI:  Occasional abdominal pain, no N/V, +GERD,  no constipation, no diarrhea, no blood in stool, (- ) family history of Colon Ca  : No dysuria, no hematuria, + frequency, no urgency, no incontinence, no testicular pain/swelling, (- ) family history of Prostate Ca  MUSC/SKEL: No myalgia, no weakness, + edema:ankles and hands, no arthralgia, no joint swelling/effusions  SKIN: No rashes, no hives, no itching, no sores  NEURO: No HA, no numbness, no tingling, no weakness, no dizziness  PSYCH: No anxiety, no depression, no irritability, no panic attacks, no s/i, no h/i, no hallucinations  ENDO: No polyuria, no polyphagia, no polydipsia  HEME: No bruising, no bleeding disorders, no signs of anemia.       PHYSICAL EXAM:    ..Visit Vitals  /88   Pulse 75   Temp 98.6 °F (37 °C)   Ht 5' 9" (1.753 m)   Wt 111.7 kg (246 lb 3.2 oz)   SpO2 97%   BMI 36.36 kg/m²        General: Well developed, well nourished obese white male in no apparent distress, alert and oriented x3  Skin: No rash or " abnormal lesions  HEENT: Normocephalic, PERRLA, EOMI, mouth WNL, throat WNL, nares normal, EAC and TM WNL bilaterally  Neck: FROM, no LAD, no thyroid abnormalities palpable  Chest: CTA bilaterally, no wheezes crackles or rubs  Cardiac: RRR, no murmurs, rubs, gallops  Abdomen: Soft, nontender, nondistended, NBSx4, no rebound tenderness or guarding, no HSM  Extremities: No clubbing, cyanosis, or edema. Joints WNL, +2 DP/PT pulses bilaterally  Neuro: No sensory or motor defects noted. CN II-XII intact. Gait WNL.  Genital: normal testes, no hernias  Rectal:  Deferred      1. Encounter for wellness examination in adult  Overview:  Patient presents for wellness examination.    He has been feeling well.    His appetite has been too good; he is gained 30 lb secondary to poor dietary choices. He injured his back and could not work out.  His back is almost completely better.  Patient advised to resume healthy diet and to limit intake of fried foods, processed foods and sugary foods.  Patient is compliant with and doing well CPAP therapy.    Last colonoscopy 10/2022:  Dr. Giraldo, diverticulosis, follow-up in 10 years.  CT Ca score 8 2022: 0.    Assessment & Plan:  Patient presents for wellness examination.    He has been feeling well.    Patient encouraged to make healthy food choices and limit portions.    Patient encouraged to limit intake of fried foods, processed foods and sugary foods.    Patient encouraged to increase physical activity, exercise regularly and lose weight.  Patient is compliant with and doing well CPAP therapy.    Last colonoscopy 10/2022; follow-up in 10 years.  Hypercholesterolemia: Start simvastatin 40 mg daily.  Recheck lipid profile in 3 months.  PSA elevation; recheck in 3 months.  Labs reviewed with patient.      2. Primary hypertension  Overview:  Controlled; continue current medication.  Limit sodium consumption.    Patient encouraged to lose weight.    Assessment & Plan:  His blood  pressures at home are in the 120s-130s/70s.  Continue Cozaar.    Limit sodium consumption.    Patient encouraged to lose weight.    Orders:  -     losartan (COZAAR) 50 MG tablet; Take 1 tablet (50 mg total) by mouth 2 (two) times daily.  Dispense: 180 tablet; Refill: 3    3. Gastroesophageal reflux disease, unspecified whether esophagitis present  Overview:  Patient had EGD in October 2022.  Patient was given omeprazole 80 mg to be taken daily for 6 months.  Patient did not follow this regimen as he thought this dose of omeprazole was excessive.  He took omeprazole 40 mg daily for few months and then discontinued it.  He now takes omeprazole every other day and Pepcid every other day with good results.    Assessment & Plan:  Patient has mild reflux on a regular basis depending on what he eats.  He has severe reflux once to twice a week.  Patient advised to increase Pepcid to 40 mg daily.  Patient would prefer not to take a PPI long term.      4. Obstructive sleep apnea on CPAP  Overview:  Patient is compliant with and doing well on CPAP therapy.    Assessment & Plan:  See overview.      5. Environmental allergies  Overview:  Continue Zyrtec and Nasacort as needed.    Assessment & Plan:  Stable; see overview.      6. Screening PSA (prostate specific antigen)  Overview:  Patient occasionally feels he does not empty his bladder; he otherwise denies any obstructive or irritative voiding symptoms.    He denies any family history of prostate cancer.    His prostate exam is benign.    PSA pending.    Assessment & Plan:  Patient reports occasional frequency; he otherwise denies obstructive or irritative voiding symptoms.    He denies any family history of prostate cancer.    PSA 1.55; the last 2 years his PSA was 0.40 and 0.42.  Repeat PSA in 3 months to ensure stability.      7. Class 1 obesity due to excess calories with serious comorbidity and body mass index (BMI) of 34.0 to 34.9 in adult  Overview:  Patient encouraged  to make healthy food choices and limit portions.    Patient encouraged to limit intake of fried foods, processed foods and sugary foods.    Patient encouraged to lose weight.    Assessment & Plan:  See overview.      8. Mild intermittent asthma without complication  Overview:  Patient states his asthma has been doing well.    He does not use QVAR daily.  Prescription for rescue inhaler sent; he seems to have flare-ups with exertion.    Assessment & Plan:  Patient has exercise-induced bronchospasm; he takes rescue inhaler as needed.  He does not need a maintenance inhaler.      9. PSA elevation  Overview:  11/06/2024:  His PSA is 1.55.  The last 2 years his PSA was 0.40 and 0.42.  Recheck PSA in 3 months to ensure stability.    Orders:  -     PSA, Total (Diagnostic); Future; Expected date: 02/06/2025    10. Hypercholesteremia  Overview:  Lipid profile: Total cholesterol 235, HDL 48, triglycerides 121 and ; this gives patient 11.5% risk of significant cardiovascular disease in the next 10 years.    Follow a low-cholesterol/low-fat diet.    Patient encouraged to lose weight.    Start simvastatin 40 mg daily; benefits, side effects and risks discussed with patient.    Start Qunol CoQ10 daily.  Recheck lipid profile in 3 months; order placed in chart.    Orders:  -     Lipid Panel; Future; Expected date: 02/06/2025    Other orders  -     simvastatin (ZOCOR) 40 MG tablet; Take 1 tablet (40 mg total) by mouth every evening.  Dispense: 90 tablet; Refill: 1         ..Follow up in about 1 year (around 11/6/2025) for Wellness, With labwork prior to visit.     Future Appointments   Date Time Provider Department Center   11/10/2025  1:00 PM Davide Adame MD [facility]

## 2024-11-04 ENCOUNTER — LAB VISIT (OUTPATIENT)
Dept: LAB | Facility: HOSPITAL | Age: 54
End: 2024-11-04
Attending: FAMILY MEDICINE
Payer: COMMERCIAL

## 2024-11-04 DIAGNOSIS — Z00.00 WELLNESS EXAMINATION: ICD-10-CM

## 2024-11-04 LAB
ALBUMIN SERPL-MCNC: 4 G/DL (ref 3.5–5)
ALBUMIN/GLOB SERPL: 1.1 RATIO (ref 1.1–2)
ALP SERPL-CCNC: 75 UNIT/L (ref 40–150)
ALT SERPL-CCNC: 23 UNIT/L (ref 0–55)
ANION GAP SERPL CALC-SCNC: 10 MEQ/L
AST SERPL-CCNC: 18 UNIT/L (ref 5–34)
BACTERIA #/AREA URNS AUTO: ABNORMAL /HPF
BASOPHILS # BLD AUTO: 0.04 X10(3)/MCL
BASOPHILS NFR BLD AUTO: 0.7 %
BILIRUB SERPL-MCNC: 0.7 MG/DL
BILIRUB UR QL STRIP.AUTO: NEGATIVE
BUN SERPL-MCNC: 17.4 MG/DL (ref 8.4–25.7)
CALCIUM SERPL-MCNC: 9.7 MG/DL (ref 8.4–10.2)
CHLORIDE SERPL-SCNC: 106 MMOL/L (ref 98–107)
CHOLEST SERPL-MCNC: 235 MG/DL
CHOLEST/HDLC SERPL: 5 {RATIO} (ref 0–5)
CLARITY UR: CLEAR
CO2 SERPL-SCNC: 25 MMOL/L (ref 22–29)
COLOR UR AUTO: ABNORMAL
CREAT SERPL-MCNC: 0.9 MG/DL (ref 0.72–1.25)
CREAT/UREA NIT SERPL: 19
EOSINOPHIL # BLD AUTO: 0.4 X10(3)/MCL (ref 0–0.9)
EOSINOPHIL NFR BLD AUTO: 7.1 %
ERYTHROCYTE [DISTWIDTH] IN BLOOD BY AUTOMATED COUNT: 12.6 % (ref 11.5–17)
EST. AVERAGE GLUCOSE BLD GHB EST-MCNC: 105.4 MG/DL
GFR SERPLBLD CREATININE-BSD FMLA CKD-EPI: >60 ML/MIN/1.73/M2
GLOBULIN SER-MCNC: 3.7 GM/DL (ref 2.4–3.5)
GLUCOSE SERPL-MCNC: 104 MG/DL (ref 74–100)
GLUCOSE UR QL STRIP: NORMAL
HBA1C MFR BLD: 5.3 %
HCT VFR BLD AUTO: 42.7 % (ref 42–52)
HDLC SERPL-MCNC: 48 MG/DL (ref 35–60)
HGB BLD-MCNC: 14.4 G/DL (ref 14–18)
HGB UR QL STRIP: ABNORMAL
IMM GRANULOCYTES # BLD AUTO: 0.02 X10(3)/MCL (ref 0–0.04)
IMM GRANULOCYTES NFR BLD AUTO: 0.4 %
KETONES UR QL STRIP: NEGATIVE
LDLC SERPL CALC-MCNC: 163 MG/DL (ref 50–140)
LEUKOCYTE ESTERASE UR QL STRIP: NEGATIVE
LYMPHOCYTES # BLD AUTO: 1.57 X10(3)/MCL (ref 0.6–4.6)
LYMPHOCYTES NFR BLD AUTO: 27.7 %
MCH RBC QN AUTO: 30.4 PG (ref 27–31)
MCHC RBC AUTO-ENTMCNC: 33.7 G/DL (ref 33–36)
MCV RBC AUTO: 90.1 FL (ref 80–94)
MONOCYTES # BLD AUTO: 0.63 X10(3)/MCL (ref 0.1–1.3)
MONOCYTES NFR BLD AUTO: 11.1 %
NEUTROPHILS # BLD AUTO: 3.01 X10(3)/MCL (ref 2.1–9.2)
NEUTROPHILS NFR BLD AUTO: 53 %
NITRITE UR QL STRIP: NEGATIVE
NRBC BLD AUTO-RTO: 0 %
PH UR STRIP: 6.5 [PH]
PLATELET # BLD AUTO: 254 X10(3)/MCL (ref 130–400)
PMV BLD AUTO: 10.7 FL (ref 7.4–10.4)
POTASSIUM SERPL-SCNC: 4.4 MMOL/L (ref 3.5–5.1)
PROT SERPL-MCNC: 7.7 GM/DL (ref 6.4–8.3)
PROT UR QL STRIP: NEGATIVE
PSA SERPL-MCNC: 1.55 NG/ML
RBC # BLD AUTO: 4.74 X10(6)/MCL (ref 4.7–6.1)
RBC #/AREA URNS AUTO: ABNORMAL /HPF
SODIUM SERPL-SCNC: 141 MMOL/L (ref 136–145)
SP GR UR STRIP.AUTO: 1.01 (ref 1–1.03)
SQUAMOUS #/AREA URNS LPF: ABNORMAL /HPF
TRIGL SERPL-MCNC: 121 MG/DL (ref 34–140)
TSH SERPL-ACNC: 1.22 UIU/ML (ref 0.35–4.94)
UROBILINOGEN UR STRIP-ACNC: NORMAL
VLDLC SERPL CALC-MCNC: 24 MG/DL
WBC # BLD AUTO: 5.67 X10(3)/MCL (ref 4.5–11.5)
WBC #/AREA URNS AUTO: ABNORMAL /HPF

## 2024-11-04 PROCEDURE — 84153 ASSAY OF PSA TOTAL: CPT

## 2024-11-04 PROCEDURE — 36415 COLL VENOUS BLD VENIPUNCTURE: CPT

## 2024-11-04 PROCEDURE — 83036 HEMOGLOBIN GLYCOSYLATED A1C: CPT

## 2024-11-04 PROCEDURE — 84443 ASSAY THYROID STIM HORMONE: CPT

## 2024-11-04 PROCEDURE — 80053 COMPREHEN METABOLIC PANEL: CPT

## 2024-11-04 PROCEDURE — 85025 COMPLETE CBC W/AUTO DIFF WBC: CPT

## 2024-11-04 PROCEDURE — 80061 LIPID PANEL: CPT

## 2024-11-04 PROCEDURE — 81001 URINALYSIS AUTO W/SCOPE: CPT

## 2024-11-06 ENCOUNTER — OFFICE VISIT (OUTPATIENT)
Dept: PRIMARY CARE CLINIC | Facility: CLINIC | Age: 54
End: 2024-11-06
Payer: COMMERCIAL

## 2024-11-06 VITALS
OXYGEN SATURATION: 97 % | BODY MASS INDEX: 36.46 KG/M2 | WEIGHT: 246.19 LBS | SYSTOLIC BLOOD PRESSURE: 134 MMHG | DIASTOLIC BLOOD PRESSURE: 88 MMHG | TEMPERATURE: 99 F | HEIGHT: 69 IN | HEART RATE: 75 BPM

## 2024-11-06 DIAGNOSIS — E78.00 HYPERCHOLESTEREMIA: ICD-10-CM

## 2024-11-06 DIAGNOSIS — J45.20 MILD INTERMITTENT ASTHMA WITHOUT COMPLICATION: ICD-10-CM

## 2024-11-06 DIAGNOSIS — R97.20 PSA ELEVATION: ICD-10-CM

## 2024-11-06 DIAGNOSIS — I10 PRIMARY HYPERTENSION: ICD-10-CM

## 2024-11-06 DIAGNOSIS — E66.09 CLASS 1 OBESITY DUE TO EXCESS CALORIES WITH SERIOUS COMORBIDITY AND BODY MASS INDEX (BMI) OF 34.0 TO 34.9 IN ADULT: ICD-10-CM

## 2024-11-06 DIAGNOSIS — Z00.00 ENCOUNTER FOR WELLNESS EXAMINATION IN ADULT: Primary | ICD-10-CM

## 2024-11-06 DIAGNOSIS — Z12.5 SCREENING PSA (PROSTATE SPECIFIC ANTIGEN): ICD-10-CM

## 2024-11-06 DIAGNOSIS — G47.33 OBSTRUCTIVE SLEEP APNEA ON CPAP: ICD-10-CM

## 2024-11-06 DIAGNOSIS — Z91.09 ENVIRONMENTAL ALLERGIES: ICD-10-CM

## 2024-11-06 DIAGNOSIS — K21.9 GASTROESOPHAGEAL REFLUX DISEASE, UNSPECIFIED WHETHER ESOPHAGITIS PRESENT: ICD-10-CM

## 2024-11-06 DIAGNOSIS — E66.811 CLASS 1 OBESITY DUE TO EXCESS CALORIES WITH SERIOUS COMORBIDITY AND BODY MASS INDEX (BMI) OF 34.0 TO 34.9 IN ADULT: ICD-10-CM

## 2024-11-06 PROCEDURE — 1159F MED LIST DOCD IN RCRD: CPT | Mod: CPTII,,, | Performed by: FAMILY MEDICINE

## 2024-11-06 PROCEDURE — 4010F ACE/ARB THERAPY RXD/TAKEN: CPT | Mod: CPTII,,, | Performed by: FAMILY MEDICINE

## 2024-11-06 PROCEDURE — 3079F DIAST BP 80-89 MM HG: CPT | Mod: CPTII,,, | Performed by: FAMILY MEDICINE

## 2024-11-06 PROCEDURE — 3075F SYST BP GE 130 - 139MM HG: CPT | Mod: CPTII,,, | Performed by: FAMILY MEDICINE

## 2024-11-06 PROCEDURE — 99396 PREV VISIT EST AGE 40-64: CPT | Mod: ,,, | Performed by: FAMILY MEDICINE

## 2024-11-06 PROCEDURE — 3044F HG A1C LEVEL LT 7.0%: CPT | Mod: CPTII,,, | Performed by: FAMILY MEDICINE

## 2024-11-06 PROCEDURE — 3008F BODY MASS INDEX DOCD: CPT | Mod: CPTII,,, | Performed by: FAMILY MEDICINE

## 2024-11-06 RX ORDER — SIMVASTATIN 40 MG/1
40 TABLET, FILM COATED ORAL NIGHTLY
Qty: 90 TABLET | Refills: 1 | Status: SHIPPED | OUTPATIENT
Start: 2024-11-06 | End: 2025-05-05

## 2024-11-06 RX ORDER — LOSARTAN POTASSIUM 50 MG/1
50 TABLET ORAL 2 TIMES DAILY
Qty: 180 TABLET | Refills: 3 | Status: SHIPPED | OUTPATIENT
Start: 2024-11-06

## 2024-11-06 RX ORDER — FAMOTIDINE 20 MG/1
TABLET, FILM COATED ORAL NIGHTLY PRN
COMMUNITY
Start: 2024-02-05

## 2024-11-06 NOTE — ASSESSMENT & PLAN NOTE
Patient presents for wellness examination.    He has been feeling well.    Patient encouraged to make healthy food choices and limit portions.    Patient encouraged to limit intake of fried foods, processed foods and sugary foods.    Patient encouraged to increase physical activity, exercise regularly and lose weight.  Patient is compliant with and doing well CPAP therapy.    Last colonoscopy 10/2022; follow-up in 10 years.  Hypercholesterolemia: Start simvastatin 40 mg daily.  Recheck lipid profile in 3 months.  PSA elevation; recheck in 3 months.  Labs reviewed with patient.

## 2024-11-06 NOTE — ASSESSMENT & PLAN NOTE
His blood pressures at home are in the 120s-130s/70s.  Continue Cozaar.    Limit sodium consumption.    Patient encouraged to lose weight.

## 2024-11-06 NOTE — ASSESSMENT & PLAN NOTE
Patient has mild reflux on a regular basis depending on what he eats.  He has severe reflux once to twice a week.  Patient advised to increase Pepcid to 40 mg daily.  Patient would prefer not to take a PPI long term.

## 2024-11-06 NOTE — ASSESSMENT & PLAN NOTE
Patient reports occasional frequency; he otherwise denies obstructive or irritative voiding symptoms.    He denies any family history of prostate cancer.    PSA 1.55; the last 2 years his PSA was 0.40 and 0.42.  Repeat PSA in 3 months to ensure stability.

## 2024-11-06 NOTE — ASSESSMENT & PLAN NOTE
Patient has exercise-induced bronchospasm; he takes rescue inhaler as needed.  He does not need a maintenance inhaler.

## 2024-11-21 ENCOUNTER — PATIENT MESSAGE (OUTPATIENT)
Dept: PRIMARY CARE CLINIC | Facility: CLINIC | Age: 54
End: 2024-11-21
Payer: COMMERCIAL

## 2025-01-29 ENCOUNTER — LAB VISIT (OUTPATIENT)
Dept: LAB | Facility: HOSPITAL | Age: 55
End: 2025-01-29
Attending: FAMILY MEDICINE
Payer: COMMERCIAL

## 2025-01-29 DIAGNOSIS — R97.20 PSA ELEVATION: ICD-10-CM

## 2025-01-29 DIAGNOSIS — E78.00 HYPERCHOLESTEREMIA: ICD-10-CM

## 2025-01-29 LAB
CHOLEST SERPL-MCNC: 158 MG/DL
CHOLEST/HDLC SERPL: 4 {RATIO} (ref 0–5)
HDLC SERPL-MCNC: 44 MG/DL (ref 35–60)
LDLC SERPL CALC-MCNC: 95 MG/DL (ref 50–140)
PSA SERPL-MCNC: 0.54 NG/ML
TRIGL SERPL-MCNC: 96 MG/DL (ref 34–140)
VLDLC SERPL CALC-MCNC: 19 MG/DL

## 2025-01-29 PROCEDURE — 84153 ASSAY OF PSA TOTAL: CPT

## 2025-01-29 PROCEDURE — 80061 LIPID PANEL: CPT

## 2025-01-29 PROCEDURE — 36415 COLL VENOUS BLD VENIPUNCTURE: CPT

## 2025-01-30 ENCOUNTER — PATIENT MESSAGE (OUTPATIENT)
Dept: PRIMARY CARE CLINIC | Facility: CLINIC | Age: 55
End: 2025-01-30
Payer: COMMERCIAL

## 2025-03-08 ENCOUNTER — PATIENT MESSAGE (OUTPATIENT)
Dept: PRIMARY CARE CLINIC | Facility: CLINIC | Age: 55
End: 2025-03-08
Payer: COMMERCIAL

## 2025-05-07 DIAGNOSIS — E78.00 HYPERCHOLESTEREMIA: Primary | ICD-10-CM

## 2025-05-07 RX ORDER — SIMVASTATIN 40 MG/1
40 TABLET, FILM COATED ORAL NIGHTLY
Qty: 90 TABLET | Refills: 1 | Status: SHIPPED | OUTPATIENT
Start: 2025-05-07 | End: 2025-11-03

## 2025-05-19 DIAGNOSIS — E78.00 HYPERCHOLESTEREMIA: ICD-10-CM

## 2025-05-19 RX ORDER — SIMVASTATIN 40 MG/1
40 TABLET, FILM COATED ORAL NIGHTLY
Qty: 90 TABLET | Refills: 3 | Status: SHIPPED | OUTPATIENT
Start: 2025-05-19

## 2025-06-07 ENCOUNTER — PATIENT MESSAGE (OUTPATIENT)
Dept: PRIMARY CARE CLINIC | Facility: CLINIC | Age: 55
End: 2025-06-07
Payer: COMMERCIAL